# Patient Record
Sex: MALE | Race: WHITE | Employment: OTHER | URBAN - METROPOLITAN AREA
[De-identification: names, ages, dates, MRNs, and addresses within clinical notes are randomized per-mention and may not be internally consistent; named-entity substitution may affect disease eponyms.]

---

## 2017-01-01 ENCOUNTER — TELEPHONE (OUTPATIENT)
Dept: FAMILY MEDICINE CLINIC | Age: 77
End: 2017-01-01

## 2017-01-01 ENCOUNTER — TELEPHONE (OUTPATIENT)
Dept: SLEEP MEDICINE | Age: 77
End: 2017-01-01

## 2017-01-01 ENCOUNTER — OFFICE VISIT (OUTPATIENT)
Dept: SLEEP MEDICINE | Age: 77
End: 2017-01-01

## 2017-01-01 ENCOUNTER — OFFICE VISIT (OUTPATIENT)
Dept: FAMILY MEDICINE CLINIC | Age: 77
End: 2017-01-01

## 2017-01-01 ENCOUNTER — OFFICE VISIT (OUTPATIENT)
Dept: NEUROLOGY | Age: 77
End: 2017-01-01

## 2017-01-01 ENCOUNTER — DOCUMENTATION ONLY (OUTPATIENT)
Dept: SLEEP MEDICINE | Age: 77
End: 2017-01-01

## 2017-01-01 VITALS
WEIGHT: 258 LBS | HEIGHT: 70 IN | HEART RATE: 69 BPM | RESPIRATION RATE: 16 BRPM | DIASTOLIC BLOOD PRESSURE: 92 MMHG | BODY MASS INDEX: 36.94 KG/M2 | OXYGEN SATURATION: 97 % | SYSTOLIC BLOOD PRESSURE: 144 MMHG | TEMPERATURE: 97.9 F

## 2017-01-01 VITALS
WEIGHT: 232 LBS | BODY MASS INDEX: 33.21 KG/M2 | HEART RATE: 69 BPM | TEMPERATURE: 98.3 F | OXYGEN SATURATION: 97 % | DIASTOLIC BLOOD PRESSURE: 92 MMHG | RESPIRATION RATE: 16 BRPM | SYSTOLIC BLOOD PRESSURE: 164 MMHG | HEIGHT: 70 IN

## 2017-01-01 VITALS
WEIGHT: 254 LBS | SYSTOLIC BLOOD PRESSURE: 168 MMHG | TEMPERATURE: 98.4 F | HEART RATE: 58 BPM | OXYGEN SATURATION: 96 % | HEIGHT: 70 IN | BODY MASS INDEX: 36.36 KG/M2 | DIASTOLIC BLOOD PRESSURE: 75 MMHG

## 2017-01-01 DIAGNOSIS — R01.1 MURMUR, CARDIAC: ICD-10-CM

## 2017-01-01 DIAGNOSIS — Z09 HOSPITAL DISCHARGE FOLLOW-UP: Primary | ICD-10-CM

## 2017-01-01 DIAGNOSIS — G47.33 OSA (OBSTRUCTIVE SLEEP APNEA): Primary | ICD-10-CM

## 2017-01-01 DIAGNOSIS — I10 ESSENTIAL HYPERTENSION WITH GOAL BLOOD PRESSURE LESS THAN 140/90: ICD-10-CM

## 2017-01-01 DIAGNOSIS — R20.0 NUMBNESS IN FEET: Primary | ICD-10-CM

## 2017-01-01 DIAGNOSIS — G57.93 NEUROPATHIC PAIN OF BOTH FEET: ICD-10-CM

## 2017-01-01 DIAGNOSIS — B35.6 TINEA CRURIS: ICD-10-CM

## 2017-01-01 DIAGNOSIS — N18.6 ESRD ON PERITONEAL DIALYSIS (HCC): ICD-10-CM

## 2017-01-01 DIAGNOSIS — Z99.2 ESRD ON PERITONEAL DIALYSIS (HCC): ICD-10-CM

## 2017-01-01 DIAGNOSIS — R60.9 EDEMA, UNSPECIFIED TYPE: ICD-10-CM

## 2017-01-01 DIAGNOSIS — E66.9 OBESITY (BMI 30-39.9): ICD-10-CM

## 2017-01-01 RX ORDER — GABAPENTIN 100 MG/1
200 CAPSULE ORAL
Qty: 180 CAP | Refills: 3 | Status: SHIPPED | OUTPATIENT
Start: 2017-01-01

## 2017-01-01 RX ORDER — DICLOFENAC SODIUM 10 MG/G
2 GEL TOPICAL 4 TIMES DAILY
Qty: 1 EACH | Refills: 2 | Status: SHIPPED | OUTPATIENT
Start: 2017-01-01

## 2017-01-01 RX ORDER — KETOCONAZOLE 20 MG/G
CREAM TOPICAL DAILY
Qty: 30 G | Refills: 3 | Status: SHIPPED | OUTPATIENT
Start: 2017-01-01

## 2017-01-17 ENCOUNTER — OFFICE VISIT (OUTPATIENT)
Dept: FAMILY MEDICINE CLINIC | Age: 77
End: 2017-01-17

## 2017-01-17 VITALS
DIASTOLIC BLOOD PRESSURE: 76 MMHG | WEIGHT: 212.5 LBS | HEART RATE: 57 BPM | SYSTOLIC BLOOD PRESSURE: 124 MMHG | BODY MASS INDEX: 30.49 KG/M2 | TEMPERATURE: 98.5 F | OXYGEN SATURATION: 97 %

## 2017-01-17 DIAGNOSIS — G47.33 OSA (OBSTRUCTIVE SLEEP APNEA): Primary | ICD-10-CM

## 2017-01-17 DIAGNOSIS — Z00.00 ROUTINE GENERAL MEDICAL EXAMINATION AT A HEALTH CARE FACILITY: ICD-10-CM

## 2017-01-17 DIAGNOSIS — M79.2 NEUROPATHIC PAIN: ICD-10-CM

## 2017-01-17 DIAGNOSIS — Z13.39 SCREENING FOR ALCOHOLISM: ICD-10-CM

## 2017-01-17 DIAGNOSIS — Z71.89 ADVANCED DIRECTIVES, COUNSELING/DISCUSSION: ICD-10-CM

## 2017-01-17 DIAGNOSIS — B35.6 TINEA CRURIS: ICD-10-CM

## 2017-01-17 RX ORDER — KETOCONAZOLE 20 MG/G
CREAM TOPICAL DAILY
Qty: 15 G | Refills: 6 | Status: SHIPPED | OUTPATIENT
Start: 2017-01-17 | End: 2017-01-01 | Stop reason: SDUPTHER

## 2017-01-17 RX ORDER — DICLOFENAC SODIUM 10 MG/G
2 GEL TOPICAL 4 TIMES DAILY
Qty: 1 EACH | Refills: 2 | Status: SHIPPED | OUTPATIENT
Start: 2017-01-17 | End: 2017-01-01 | Stop reason: SDUPTHER

## 2017-01-17 NOTE — MR AVS SNAPSHOT
Visit Information Date & Time Provider Department Dept. Phone Encounter #  
 1/17/2017 10:00 AM Jani Fournier MD [facility] [id number] Follow-up Instructions Return in about 6 months (around 7/17/2017). Upcoming Health Maintenance Date Due DTaP/Tdap/Td series (1 - Tdap) 6/28/1961 ZOSTER VACCINE AGE 60> 6/28/2000 GLAUCOMA SCREENING Q2Y 6/28/2005 Pneumococcal 65+ High/Highest Risk (1 of 2 - PCV13) 6/28/2005 INFLUENZA AGE 9 TO ADULT 8/1/2016 MEDICARE YEARLY EXAM 1/18/2018 Allergies as of 1/17/2017  Review Complete On: 1/17/2017 By: Jani Fournier MD  
  
 Severity Noted Reaction Type Reactions Other Food  11/30/2016    Nausea and Vomiting, Other (comments) BELL PEPPERS- NUMBNESS FACE & PUFFINESS;    
 Prednisone  11/30/2016    Other (comments) \"NECROTIC HIP\" Statins-hmg-coa Reductase Inhibitors  11/30/2016    Swelling, Other (comments) FLUSHING; DISORIENTATION Current Immunizations  Never Reviewed No immunizations on file. Not reviewed this visit You Were Diagnosed With   
  
 Codes Comments KAYA (obstructive sleep apnea)    -  Primary ICD-10-CM: G47.33 
ICD-9-CM: 327.23 Routine general medical examination at a health care facility     ICD-10-CM: Z00.00 ICD-9-CM: V70.0 Screening for alcoholism     ICD-10-CM: Z13.89 ICD-9-CM: V79.1 Advanced directives, counseling/discussion     ICD-10-CM: Z71.89 ICD-9-CM: V65.49 Neuropathic pain     ICD-10-CM: M79.2 ICD-9-CM: 729.2 Tinea cruris     ICD-10-CM: B35.6 ICD-9-CM: 110.3 Vitals BP Pulse Temp Weight(growth percentile) SpO2 BMI  
 124/76 (!) 57 98.5 °F (36.9 °C) 212 lb 8 oz (96.4 kg) 97% 30.49 kg/m2 Smoking Status Former Smoker BMI and BSA Data Body Mass Index Body Surface Area  
 30.49 kg/m 2 2.18 m 2 Preferred Pharmacy Pharmacy Name Phone [facility] [address] [place] [address],  Liberty Frye [address] [place] [address] [phone] Your Updated Medication List  
  
   
This list is accurate as of: 1/17/17 10:59 AM.  Always use your most recent med list.  
  
  
  
  
 aspirin delayed-release 81 mg tablet Take  by mouth daily. calcitRIOL 0.5 mcg capsule Commonly known as:  ROCALTROL Take 0.5 mcg by mouth daily. DIALYVITE 800 WITH ZINC PO Take  by mouth. diclofenac 1 % Gel Commonly known as:  VOLTAREN Apply 2 g to affected area four (4) times daily. furosemide 40 mg tablet Commonly known as:  LASIX Take 1 Tab by mouth two (2) times a day.  
  
 gabapentin 100 mg capsule Commonly known as:  NEURONTIN  
TK 1 PO QHS  Indications: NEUROPATHIC PAIN  
  
 GLUCOSAMINE MSM PO Take  by mouth. 2 TABS DAILY  
  
 hydrALAZINE 50 mg tablet Commonly known as:  APRESOLINE Take 25 mg by mouth three (3) times daily as needed. HYDROcodone-acetaminophen 5-325 mg per tablet Commonly known as:  Abby Arnie Take 1-2 Tabs by mouth every four (4) hours as needed for Pain. Max Daily Amount: 12 Tabs.  
  
 ketoconazole 2 % topical cream  
Commonly known as:  NIZORAL Apply  to affected area daily. labetalol 100 mg tablet Commonly known as:  NORMODYNE  
TK 2 T PO BID FOR BLOOD PRESSURE GREATER THAN 130  
  
 losartan 100 mg tablet Commonly known as:  COZAAR Take 100 mg by mouth daily. RENVELA 800 mg Tab tab Generic drug:  sevelamer carbonate Take  by mouth three (3) times daily. 2-4 TABS WITH MEALS  
  
 TYLENOL EXTRA STRENGTH 500 mg tablet Generic drug:  acetaminophen Take  by mouth every six (6) hours as needed for Pain. VITAMIN D3 1,000 unit Cap Generic drug:  cholecalciferol Take  by mouth daily. Prescriptions Sent to Pharmacy Refills  
 diclofenac (VOLTAREN) 1 % gel 2 Sig: Apply 2 g to affected area four (4) times daily.   
 Class: Normal  
 Pharmacy: [facility] [address] [facility], [address] [place] [address] [address] Ph #: [phone] Route: Topical  
 ketoconazole (NIZORAL) 2 % topical cream 6 Sig: Apply  to affected area daily. Class: Normal  
 Pharmacy: [facility] [address] [place] [address], [address] [place] [address] [address] Ph #: [phone] Route: Topical  
  
We Performed the Following ADVANCE CARE PLANNING FIRST 30 MINS [47854 CPT(R)] REFERRAL TO NEUROLOGY [RTU71 Custom] Comments:  
 Neuropathic pain in both feet. eval and treat SLEEP MEDICINE REFERRAL [EKI733 Custom] Comments: H/o sleep apnea , needs update on RX for cpap and equipment. eval and treat Follow-up Instructions Return in about 6 months (around 7/17/2017). Referral Information Referral ID Referred By Referred To  
  
 [id number] [place], [address] [facility], [address] [facility] Armando Valdez  Phone: [phone] Fax: [phone] Visits Status Start Date End Date 1 New Request 1/17/17 1/17/18 If your referral has a status of pending review or denied, additional information will be sent to support the outcome of this decision. Referral ID Referred By Referred To  
 [id number] BRAULIO, [address] [facility], [address] [facility] Phone: [phone] Fax: [phone] Visits Status Start Date End Date 1 New Request 1/17/17 1/17/18 If your referral has a status of pending review or denied, additional information will be sent to support the outcome of this decision. Introducing [facility]! Beth Rivas introduces [employer] patient portal. Now you can access parts of your medical record, email your doctor's office, and request medication refills online.    
 
1. In your internet browser, go to [email or web address] 2. Click on the First Time User? Click Here link in the Sign In box. You will see the New Member Sign Up page. 3. Enter your [employer] Access Code exactly as it appears below. You will not need to use this code after youve completed the sign-up process. If you do not sign up before the expiration date, you must request a new code. · [employer] Access Code: [email or web address] Expires: 4/17/2017 10:56 AM 
 
4. Enter the last four digits of your Social Security Number (xxxx) and Date of Birth (mm/dd/yyyy) as indicated and click Submit. You will be taken to the next sign-up page. 5. Create a [email or web address] ID. This will be your [employer] login ID and cannot be changed, so think of one that is secure and easy to remember. 6. Create a [employer] password. You can change your password at any time. 7. Enter your Password Reset Question and Answer. This can be used at a later time if you forget your password. 8. Enter your e-mail address. You will receive e-mail notification when new information is available in [address] 9. Click Sign Up. You can now view and download portions of your medical record. 10. Click the Download Summary menu link to download a portable copy of your medical information. If you have questions, please visit the Frequently Asked Questions section of the [employer] website. Remember, [employer] is NOT to be used for urgent needs. For medical emergencies, dial 911. Now available from your iPhone and Android! Please provide this summary of care documentation to your next provider. Your primary care clinician is listed as Faith Pond. If you have any questions after today's visit, please call [phone].

## 2017-01-17 NOTE — PROGRESS NOTES
1. Have you been to the ER, urgent care clinic since your last visit? Hospitalized since your last visit? No    2. Have you seen or consulted any other health care providers outside of the [address] [place] [address] since your last visit? Include any pap smears or colon screening.  No     Chief Complaint   Patient presents with   [facility] Annual Wellness Visit

## 2017-01-17 NOTE — ACP (ADVANCE CARE PLANNING)
Advance Care Planning    Advance Care Planning (ACP) Provider Conversation Snapshot    Date of ACP Conversation: 01/17/17  Persons included in Conversation:  patient and family  Length of ACP Conversation in minutes:  20 minutes    Authorized Decision Maker (if patient is incapable of making informed decisions): This person is:   d          For Patients with Decision Making Capacity:   Values/Goals: Exploration of values, goals, and preferences if recovery is not expected, even with continued medical treatment in the event of:  Imminent death  \"In these circumstances, what matters most to you? \"  Care focused more on comfort or quality of life.     Conversation Outcomes / Follow-Up Plan:   Recommended completion of Advance Directive form after review of ACP materials and conversation with prospective healthcare agent

## 2017-01-17 NOTE — PROGRESS NOTES
This is a Subsequent Medicare Annual Wellness Visit providing Personalized Prevention Plan Services (PPPS) (Performed 12 months after initial AWV and PPPS )    I have reviewed the patient's medical history in detail and updated the computerized patient record. Needs referral to sleep med for update on cpap  Has h/o neuropathic pain in both feet. He want to see neurology to determine why  History     Past Medical History   Diagnosis Date    Adverse effect of anesthesia      WAKING DURING SURGERY; COMBATIVE    CAD (coronary artery disease)     Chronic kidney disease     GERD (gastroesophageal reflux disease)      HX-NOT CURRENTLY    Hypertension     Neuropathy     Sleep apnea      CPAP    Thromboembolus (HCC)      L LEG- PT STATED HE DID NOT SEE AN M.YUAN Barber 2010      Past Surgical History   Procedure Laterality Date    Pr cardiac surg procedure unlist  2011     CABG; AORTIC VALVE    Hx rotator cuff repair       RIGHT    Hx appendectomy      Hx urological       KIDNEY STONE REMOVED    Hx urological       LITHOTRIPSY    Hx tonsillectomy      Hx adenoidectomy      Hx other surgical  2013     PERITONEAL CATHETER     Hx other surgical  2012     R CHEST CATH FOR DIALYSIS / REMOVAL    Hx hernia repair  2013     W/MESH     Current Outpatient Prescriptions   Medication Sig Dispense Refill    diclofenac (VOLTAREN) 1 % gel Apply 2 g to affected area four (4) times daily. 1 Each 2    ketoconazole (NIZORAL) 2 % topical cream Apply  to affected area daily. 15 g 6    HYDROcodone-acetaminophen (NORCO) 5-325 mg per tablet Take 1-2 Tabs by mouth every four (4) hours as needed for Pain. Max Daily Amount: 12 Tabs. 55 Tab 0    losartan (COZAAR) 100 mg tablet Take 100 mg by mouth daily.       gabapentin (NEURONTIN) 100 mg capsule TK 1 PO QHS  Indications: NEUROPATHIC PAIN (Patient taking differently: 200 mg. TK 1 PO QHS  Indications: NEUROPATHIC PAIN) 90 Cap 3    labetalol (NORMODYNE) 100 mg tablet TK 2 T PO BID FOR BLOOD PRESSURE GREATER THAN 130  5    sevelamer carbonate (RENVELA) 800 mg tab tab Take  by mouth three (3) times daily. 2-4 TABS WITH MEALS      VITAMIN B COMP AND C/FA/ZINC (DIALYVITE 800 WITH ZINC PO) Take  by mouth.  GLUCOSAMINE HCL/MSM (GLUCOSAMINE MSM PO) Take  by mouth. 2 TABS DAILY      acetaminophen (TYLENOL EXTRA STRENGTH) 500 mg tablet Take  by mouth every six (6) hours as needed for Pain.  hydrALAZINE (APRESOLINE) 50 mg tablet Take 25 mg by mouth three (3) times daily as needed.  cholecalciferol (VITAMIN D3) 1,000 unit cap Take  by mouth daily.  calcitRIOL (ROCALTROL) 0.5 mcg capsule Take 0.5 mcg by mouth daily.  aspirin delayed-release 81 mg tablet Take  by mouth daily.  furosemide (LASIX) 40 mg tablet Take 1 Tab by mouth two (2) times a day.  60 Tab 6     Allergies   Allergen Reactions    Other Food Nausea and Vomiting and Other (comments)     BELL PEPPERS- NUMBNESS FACE & PUFFINESS;      Prednisone Other (comments)     \"NECROTIC HIP\"    Statins-Hmg-Coa Reductase Inhibitors Swelling and Other (comments)     FLUSHING; DISORIENTATION     Family History   Problem Relation Age of Onset    Hypertension Mother     Hypertension Father     Heart Disease Father     Cancer Brother      PROSTATE    Anesth Problems Neg Hx      Social History   Substance Use Topics    Smoking status: Former Smoker    Smokeless tobacco: Never Used    Alcohol use Yes      Comment: OCCASIONAL     Patient Active Problem List   Diagnosis Code    Essential hypertension with goal blood pressure less than 140/90 I10    Edema R60.9    Hearing loss H91.90    Murmur, cardiac R01.1    Neuropathic pain of both feet ([address] G62.9    ESRD on peritoneal dialysis ([address] N18.6, Z99.2       Depression Risk Factor Screening:     PHQ 2 / 9, over the last two weeks 1/17/2017   Little interest or pleasure in doing things Not at all   Feeling down, depressed or hopeless Not at all   Total Score PHQ 2 0     Alcohol Risk Factor Screening: On any occasion during the past 3 months, have you had more than 4 drinks containing alcohol? No    Do you average more than 14 drinks per week? No      Functional Ability and Level of Safety:     Hearing Loss   normal-to-mild    Activities of Daily Living   Self-care. Requires assistance with: no ADLs    Fall Risk     Fall Risk Assessment, last 12 mths 1/17/2017   Able to walk? Yes   Fall in past 12 months? No     Abuse Screen   Patient is not abused    Review of Systems   A comprehensive review of systems was negative except for that written in the HPI. Physical Examination     Evaluation of Cognitive Function:  Mood/affect:  happy  Appearance: age appropriate  Family member/caregiver input:       Visit Vitals    /76    Pulse (!) 57    Temp 98.5 °F (36.9 °C)    Wt 212 lb 8 oz (96.4 kg)    SpO2 97%    BMI 30.49 kg/m2     General:  Alert, cooperative, no distress, appears stated age. Head:  Normocephalic, without obvious abnormality, atraumatic. Eyes:  Conjunctivae/corneas clear. PERRL, EOMs intact. Fundi benign   Ears:  Normal TMs and external ear canals both ears. Nose: Nares normal. Septum midline. Mucosa normal. No drainage or sinus tenderness. Throat: Lips, mucosa, and tongue normal. Teeth and gums normal.   Neck: Supple, symmetrical, trachea midline, no adenopathy, thyroid: no enlargement/tenderness/nodules, no carotid bruit and no JVD. Back:   Symmetric, no curvature. ROM normal. No CVA tenderness. Lungs:   Clear to auscultation bilaterally. Chest wall:  No tenderness or deformity. Heart:  Regular rate and rhythm, S1, S2 normal, 2/6 murmur, no click, rub or gallop. Abdomen:   Soft, non-tender. Bowel sounds normal. No masses,  No organomegaly. Extremities: Extremities normal, atraumatic, no cyanosis or edema. Pulses: 2+ and symmetric all extremities.    Skin: Skin color, texture, turgor normal. No rashes or lesions   Lymph nodes: Cervical, supraclavicular, and axillary nodes normal.   Neurologic: CNII-XII intact. Normal strength, sensation and reflexes throughout. Patient Care Team:  Jorge Luis Mccoy MD as PCP - [facility])    Advice/Referrals/Counseling   Education and counseling provided:  Are appropriate based on today's review and evaluation    Assessment/Plan       ICD-10-CM ICD-9-CM    1. KAYA (obstructive sleep apnea) G47.33 327.23 SLEEP MEDICINE REFERRAL   2. Routine general medical examination at a health care facility Z00.00 V70.0    3. Screening for alcoholism Z13.89 V79.1    4. Advanced directives, counseling/discussion Z71.89 V65.49 ADVANCE CARE PLANNING FIRST 30 MINS   5. Neuropathic pain M79.2 729.2 REFERRAL TO NEUROLOGY   6.  Tinea cruris B35.6 110.3 ketoconazole (NIZORAL) 2 % topical cream   .

## 2017-02-17 NOTE — PROGRESS NOTES
Patients PAP machine was unable to be downloaded. The following information was able to be collected: Therapy Type: CPAP  PAP /Model: Respironcs/REMStar-Pro/Mseries  Set Pressure: ?  Heated humidifier: yes  Mask /Size: None    No power cord. Cannot get pressure.

## 2017-02-17 NOTE — TELEPHONE ENCOUNTER
Pharmacy called to inform me the pt said that the medication order was wrong for the Gabapentin 100 mg TK 1 PO QHS  Indications: NEUROPATHIC PAIN, it should be take 300 mg every day. I could not find anything to support this claim, so I denied the change and forward it to Dr. Apryl Quezada for review.

## 2017-02-17 NOTE — PATIENT INSTRUCTIONS
[address] [facility] [place]. [place], [address]  Tel.  [phone]  Fax. 100 [facility] 60  [place], [address] [facility]  Tel.  [phone]  Fax. [phone] [address]  Tel.  [phone]  Fax. [phone]     Learning About CPAP for Sleep Apnea  What is CPAP? CPAP is a small machine that you use at home every night while you sleep. It increases air pressure in your throat to keep your airway open. When you have sleep apnea, this can help you sleep better so you feel much better. CPAP stands for \"continuous positive airway pressure. \"  The CPAP machine will have one of the following:  · A mask that covers your nose and mouth  · Prongs that fit into your nose  · A mask that covers your nose only, the most common type. This type is called NCPAP. The N stands for \"nasal.\"  Why is it done? CPAP is usually the best treatment for obstructive sleep apnea. It is the first treatment choice and the most widely used. Your doctor may suggest CPAP if you have:  · Moderate to severe sleep apnea. · Sleep apnea and coronary artery disease (CAD) or heart failure. How does it help? · CPAP can help you have more normal sleep, so you feel less sleepy and more alert during the daytime. · CPAP may help keep heart failure or other heart problems from getting worse. · NCPAP may help lower your blood pressure. · If you use CPAP, your bed partner may also sleep better because you are not snoring or restless. What are the side effects? Some people who use CPAP have:  · A dry or stuffy nose and a sore throat. · Irritated skin on the face. · Sore eyes. · Bloating. If you have any of these problems, work with your doctor to fix them. Here are some things you can try:  · Be sure the mask or nasal prongs fit well. · See if your doctor can adjust the pressure of your CPAP. · If your nose is dry, try a humidifier.   · If your nose is runny or stuffy, try decongestant medicine or a steroid nasal spray. If these things do not help, you might try a different type of machine. Some machines have air pressure that adjusts on its own. Others have air pressures that are different when you breathe in than when you breathe out. This may reduce discomfort caused by too much pressure in your nose. Where can you learn more? Go to Ranker.be  Enter Al Magdaleno in the search box to learn more about \"Learning About CPAP for Sleep Apnea. \"   © 9037-0595 Healthwise, Incorporated. Care instructions adapted under license by [employer] (which disclaims liability or warranty for this information). This care instruction is for use with your licensed healthcare professional. If you have questions about a medical condition or this instruction, always ask your healthcare professional. [address] any warranty or liability for your use of this information. Content Version: 0.3.46316; Last Revised: January 11, 2010  PROPER SLEEP HYGIENE    What to avoid  · Do not have drinks with caffeine, such as coffee or black tea, for 8 hours before bed. · Do not smoke or use other types of tobacco near bedtime. Nicotine is a stimulant and can keep you awake. · Avoid drinking alcohol late in the evening, because it can cause you to wake in the middle of the night. · Do not eat a big meal close to bedtime. If you are hungry, eat a light snack. · Do not drink a lot of water close to bedtime, because the need to urinate may wake you up during the night. · Do not read or watch TV in bed. Use the bed only for sleeping and sexual activity. What to try  · Go to bed at the same time every night, and wake up at the same time every morning. Do not take naps during the day. · Keep your bedroom quiet, dark, and cool. · Get regular exercise, but not within 3 to 4 hours of your bedtime. .  · Sleep on a comfortable pillow and mattress.   · If watching the clock makes you anxious, turn it facing away from you so you cannot see the time. · If you worry when you lie down, start a worry book. Well before bedtime, write down your worries, and then set the book and your concerns aside. · Try meditation or other relaxation techniques before you go to bed. · If you cannot fall asleep, get up and go to another room until you feel sleepy. Do something relaxing. Repeat your bedtime routine before you go to bed again. · Make your house quiet and calm about an hour before bedtime. Turn down the lights, turn off the TV, log off the computer, and turn down the volume on music. This can help you relax after a busy day. Drowsy Driving: The [facility] 54 cites drowsiness as a causing factor in more than 732,018 police reported crashes annually, resulting in 76,000 injuries and 1,500 deaths. Other surveys suggest 55% of people polled have driven while drowsy in the past year, 23% had fallen asleep but not crashed, 3% crashed, and 2% had and accident due to drowsy driving. Who is at risk? Young Drivers: One study of drowsy driving accidents states that 55% of the drivers were under 25 years. Of those, 75% were male. Shift Workers and Travelers: People who work overnight or travel across time zones frequently are at higher risk of experiencing Circadian Rhythm Disorders. They are trying to work and function when their body is programed to sleep. Sleep Deprived: Lack of sleep has a serious impact on your ability to pay attention or focus on a task. Consistently getting less than the average of 8 hours your body needs creates partial or cumulative sleep deprivation. Untreated Sleep Disorders: Sleep Apnea, Narcolepsy, R.L.S., and other sleep disorders (untreated) prevent a person from getting enough restful sleep. This leads to excessive daytime sleepiness and increases the risk for drowsy driving accidents by up to 7 times.   Medications / Alcohol: Even over the counter medications can cause drowsiness. Medications that impair a drivers attention should have a warning label. Alcohol naturally makes you sleepy and on its own can cause accidents. Combined with excessive drowsiness its effects are amplified. Signs of Drowsy Driving:   * You don't remember driving the last few miles   * You may drift out of your jaxon   * You are unable to focus and your thoughts wander   * You may yawn more often than normal   * You have difficulty keeping your eyes open / nodding off   * Missing traffic signs, speeding, or tailgating  Prevention-   Good sleep hygiene, lifestyle and behavioral choices have the most impact on drowsy driving. There is no substitute for sleep and the average person requires 8 hours nightly. If you find yourself driving drowsy, stop and sleep. Consider the sleep hygiene tips provided during your visit as well. Medication Refill Policy: Refills for all medications require 1 week advance notice. Please have your pharmacy fax a refill request. We are unable to fax, or call in \"controled substance\" medications and you will need to pick these prescriptions up from our office. [employer] Activation    Thank you for requesting access to [employer]. Please follow the instructions below to securely access and download your online medical record. [employer] allows you to send messages to your doctor, view your test results, renew your prescriptions, schedule appointments, and more. How Do I Sign Up? 1. In your internet browser, go to [email or web address]. 2. Click on the First Time User? Click Here link in the Sign In box. You will see the New Member Sign Up page. 3. Enter your [employer] Access Code exactly as it appears below. You will not need to use this code after youve completed the sign-up process. If you do not sign up before the expiration date, you must request a new code.     [employer] Access Code: [email or web address]  Expires: 2017 10:56 AM (This is the date your Purple access code will )    4. Enter the last four digits of your Social Security Number (xxxx) and Date of Birth (mm/dd/yyyy) as indicated and click Submit. You will be taken to the next sign-up page. 5. Create a Purple ID. This will be your Purple login ID and cannot be changed, so think of one that is secure and easy to remember. 6. Create a Purple password. You can change your password at any time. 7. Enter your Password Reset Question and Answer. This can be used at a later time if you forget your password. 8. Enter your e-mail address. You will receive e-mail notification when new information is available in [address] 9. Click Sign Up. You can now view and download portions of your medical record. 10. Click the Download Summary menu link to download a portable copy of your medical information. Additional Information    If you have questions, please call [phone]. Remember, Purple is NOT to be used for urgent needs. For medical emergencies, dial 911. Starting a Weight Loss Plan: After Your Visit  Your Care Instructions  If you are thinking about losing weight, it can be hard to know where to start. Your doctor can help you set up a weight loss plan that best meets your needs. You may want to take a class on nutrition or exercise, or join a weight loss support group. If you have questions about how to make changes to your eating or exercise habits, ask your doctor about seeing a registered dietitian or an exercise specialist.  It can be a big challenge to lose weight. But you do not have to make huge changes at once. Make small changes, and stick with them. When those changes become habit, add a few more changes. If you do not think you are ready to make changes right now, try to pick a date in the future.  Make an appointment to see your doctor to discuss whether the time is right for you to start a plan.  Follow-up care is a key part of your treatment and safety. Be sure to make and go to all appointments, and call your doctor if you are having problems. It's also a good idea to know your test results and keep a list of the medicines you take. How can you care for yourself at home? · Set realistic goals. Many people expect to lose much more weight than is likely. A weight loss of 5% to 10% of your body weight may be enough to improve your health. · Get family and friends involved to provide support. Talk to them about why you are trying to lose weight, and ask them to help. They can help by participating in exercise and having meals with you, even if they may be eating something different. · Find what works best for you. If you do not have time or do not like to cook, a program that offers meal replacement bars or shakes may be better for you. Or if you like to prepare meals, finding a plan that includes daily menus and recipes may be best.  · Ask your doctor about other health professionals who can help you achieve your weight loss goals. ¨ A dietitian can help you make healthy changes in your diet. ¨ An exercise specialist or  can help you develop a safe and effective exercise program.  ¨ A counselor or psychiatrist can help you cope with issues such as depression, anxiety, or family problems that can make it hard to focus on weight loss. · Consider joining a support group for people who are trying to lose weight. Your doctor can suggest groups in your area. Where can you learn more? Go to Sellsy.be  Enter U357 in the search box to learn more about \"Starting a Weight Loss Plan: After Your Visit. \"   © 7378-1575 Healthwise, Incorporated. Care instructions adapted under license by [facility] (which disclaims liability or warranty for this information).  This care instruction is for use with your licensed healthcare professional. If you have questions about a medical condition or this instruction, always ask your healthcare professional. [place] [address] any warranty or liability for your use of this information.   Content Version: 4.8.61712; Last Revised: September 1, 2009

## 2017-02-17 NOTE — MR AVS SNAPSHOT
Visit Information Date & Time Provider Department Dept. Phone Encounter #  
 2/17/2017 11:20 AM Mack Jones, [address] [id number] Follow-up Instructions Return in about 1 year (around 2/17/2018), or if symptoms worsen or fail to improve. Follow-up and Disposition History Your Appointments 3/2/2017  1:00 PM  
New Patient with Matias Bond MD  
[facility] Appt Note: np..possible neuropathy seb [place] [address] [facility] [id number] [phone]  
  
   
 [place] [address] [id number] [address] Upcoming Health Maintenance Date Due DTaP/Tdap/Td series (1 - Tdap) 6/28/1961 ZOSTER VACCINE AGE 60> 6/28/2000 GLAUCOMA SCREENING Q2Y 6/28/2005 Pneumococcal 65+ High/Highest Risk (1 of 2 - PCV13) 6/28/2005 INFLUENZA AGE 9 TO ADULT 8/1/2016 MEDICARE YEARLY EXAM 1/18/2018 Allergies as of 2/17/2017  Review Complete On: 2/17/2017 By: Mack Jones MD  
  
 Severity Noted Reaction Type Reactions Other Food  11/30/2016    Nausea and Vomiting, Other (comments) BELL PEPPERS- NUMBNESS FACE & PUFFINESS;    
 Prednisone  11/30/2016    Other (comments) \"NECROTIC HIP\" Statins-hmg-coa Reductase Inhibitors  11/30/2016    Swelling, Other (comments) FLUSHING; DISORIENTATION Current Immunizations  Never Reviewed No immunizations on file. Not reviewed this visit You Were Diagnosed With   
  
 Codes Comments KAYA (obstructive sleep apnea)    -  Primary ICD-10-CM: G47.33 
ICD-9-CM: 327.23 Obesity (BMI 30-39. 9)     ICD-10-CM: E66.9 ICD-9-CM: 278.00 Vitals BP Pulse Temp Height(growth percentile) Weight(growth percentile) SpO2  
 168/75 (!) 58 98.4 °F (36.9 °C) 5' 10\" (1.778 m) 254 lb (115.2 kg) 96% BMI Smoking Status 36.45 kg/m2 Former Smoker Vitals History BMI and BSA Data Body Mass Index Body Surface Area  
 36.45 kg/m 2 2.39 m 2 [facility],  [place] [address] [place] [address] [phone] Your Updated Medication List  
  
   
This list is accurate as of: 2/17/17 12:36 PM.  Always use your most recent med list.  
  
  
  
  
 aspirin delayed-release 81 mg tablet Take  by mouth daily. calcitRIOL 0.5 mcg capsule Commonly known as:  ROCALTROL Take 0.5 mcg by mouth daily. DIALYVITE 800 WITH ZINC PO Take  by mouth. diclofenac 1 % Gel Commonly known as:  VOLTAREN Apply 2 g to affected area four (4) times daily. furosemide 40 mg tablet Commonly known as:  LASIX Take 1 Tab by mouth two (2) times a day.  
  
 gabapentin 100 mg capsule Commonly known as:  NEURONTIN  
TK 1 PO QHS  Indications: NEUROPATHIC PAIN  
  
 GLUCOSAMINE MSM PO Take  by mouth. 2 TABS DAILY  
  
 hydrALAZINE 50 mg tablet Commonly known as:  APRESOLINE Take 25 mg by mouth three (3) times daily as needed. HYDROcodone-acetaminophen 5-325 mg per tablet Commonly known as:  Villanueva Ale Take 1-2 Tabs by mouth every four (4) hours as needed for Pain. Max Daily Amount: 12 Tabs.  
  
 ketoconazole 2 % topical cream  
Commonly known as:  NIZORAL Apply  to affected area daily. labetalol 100 mg tablet Commonly known as:  NORMODYNE  
TK 2 T PO BID FOR BLOOD PRESSURE GREATER THAN 130  
  
 losartan 100 mg tablet Commonly known as:  COZAAR Take 100 mg by mouth daily. RENVELA 800 mg Tab tab Generic drug:  sevelamer carbonate Take  by mouth three (3) times daily. 2-4 TABS WITH MEALS  
  
 TYLENOL EXTRA STRENGTH 500 mg tablet Generic drug:  acetaminophen Take  by mouth every six (6) hours as needed for Pain. VITAMIN D3 1,000 unit Cap Generic drug:  cholecalciferol Take  by mouth daily. We Performed the Following AMB SUPPLY ORDER [[id number] Custom] Comments:  
 PAP Mask -patient preference, tubing, filters as needed (all sleep supplies) Length of Need = 99 months Vanessa Mireles M.D.  (electronically signed) Diplomate in Sleep Medicine, ABIM Follow-up Instructions Return in about 1 year (around 2/17/2018), or if symptoms worsen or fail to improve. Patient Instructions 217 [facility] [place]. 1668 [facility], [address] Tel.  [phone] Fax. 100 [facility] 60 [place], [address] [facility] Tel.  [phone] Fax. [phone] [address] [facility] [place] [address] Tel.  [phone] Fax. [phone] Learning About CPAP for Sleep Apnea What is CPAP? CPAP is a small machine that you use at home every night while you sleep. It increases air pressure in your throat to keep your airway open. When you have sleep apnea, this can help you sleep better so you feel much better. CPAP stands for \"continuous positive airway pressure. \" The CPAP machine will have one of the following: · A mask that covers your nose and mouth · Prongs that fit into your nose · A mask that covers your nose only, the most common type. This type is called NCPAP. The N stands for \"nasal.\" Why is it done? CPAP is usually the best treatment for obstructive sleep apnea. It is the first treatment choice and the most widely used. Your doctor may suggest CPAP if you have: · Moderate to severe sleep apnea. · Sleep apnea and coronary artery disease (CAD) or heart failure. How does it help? · CPAP can help you have more normal sleep, so you feel less sleepy and more alert during the daytime. · CPAP may help keep heart failure or other heart problems from getting worse. · NCPAP may help lower your blood pressure. · If you use CPAP, your bed partner may also sleep better because you are not snoring or restless. What are the side effects? Some people who use CPAP have: · A dry or stuffy nose and a sore throat. · Irritated skin on the face. · Sore eyes. · Bloating. If you have any of these problems, work with your doctor to fix them. Here are some things you can try: · Be sure the mask or nasal prongs fit well. · See if your doctor can adjust the pressure of your CPAP. · If your nose is dry, try a humidifier. · If your nose is runny or stuffy, try decongestant medicine or a steroid nasal spray. If these things do not help, you might try a different type of machine. Some machines have air pressure that adjusts on its own. Others have air pressures that are different when you breathe in than when you breathe out. This may reduce discomfort caused by too much pressure in your nose. Where can you learn more? Go to Flexiroam.be Enter B681 in the search box to learn more about \"Learning About CPAP for Sleep Apnea. \"  
© 0904-9835 Healthwise, Incorporated. Care instructions adapted under license by [employer] (which disclaims liability or warranty for this information). This care instruction is for use with your licensed healthcare professional. If you have questions about a medical condition or this instruction, always ask your healthcare professional. [address] any warranty or liability for your use of this information. Content Version: 5.0.27307; Last Revised: January 11, 2010 PROPER SLEEP HYGIENE What to avoid · Do not have drinks with caffeine, such as coffee or black tea, for 8 hours before bed. · Do not smoke or use other types of tobacco near bedtime. Nicotine is a stimulant and can keep you awake. · Avoid drinking alcohol late in the evening, because it can cause you to wake in the middle of the night. · Do not eat a big meal close to bedtime. If you are hungry, eat a light snack.  
· Do not drink a lot of water close to bedtime, because the need to urinate may wake you up during the night. · Do not read or watch TV in bed. Use the bed only for sleeping and sexual activity. What to try · Go to bed at the same time every night, and wake up at the same time every morning. Do not take naps during the day. · Keep your bedroom quiet, dark, and cool. · Get regular exercise, but not within 3 to 4 hours of your bedtime. Rissa Winchester · Sleep on a comfortable pillow and mattress. · If watching the clock makes you anxious, turn it facing away from you so you cannot see the time. · If you worry when you lie down, start a worry book. Well before bedtime, write down your worries, and then set the book and your concerns aside. · Try meditation or other relaxation techniques before you go to bed. · If you cannot fall asleep, get up and go to another room until you feel sleepy. Do something relaxing. Repeat your bedtime routine before you go to bed again. · Make your house quiet and calm about an hour before bedtime. Turn down the lights, turn off the TV, log off the computer, and turn down the volume on music. This can help you relax after a busy day. Drowsy Driving: The [employer] cites drowsiness as a causing factor in more than 551,367 police reported crashes annually, resulting in 76,000 injuries and 1,500 deaths. Other surveys suggest 55% of people polled have driven while drowsy in the past year, 23% had fallen asleep but not crashed, 3% crashed, and 2% had and accident due to drowsy driving. Who is at risk? Young Drivers: One study of drowsy driving accidents states that 55% of the drivers were under 25 years. Of those, 75% were male. Shift Workers and Travelers: People who work overnight or travel across time zones frequently are at higher risk of experiencing Circadian Rhythm Disorders. They are trying to work and function when their body is programed to sleep. Sleep Deprived: Lack of sleep has a serious impact on your ability to pay attention or focus on a task. Consistently getting less than the average of 8 hours your body needs creates partial or cumulative sleep deprivation. Untreated Sleep Disorders: Sleep Apnea, Narcolepsy, R.L.S., and other sleep disorders (untreated) prevent a person from getting enough restful sleep. This leads to excessive daytime sleepiness and increases the risk for drowsy driving accidents by up to 7 times. Medications / Alcohol: Even over the counter medications can cause drowsiness. Medications that impair a drivers attention should have a warning label. Alcohol naturally makes you sleepy and on its own can cause accidents. Combined with excessive drowsiness its effects are amplified. Signs of Drowsy Driving: * You don't remember driving the last few miles * You may drift out of your jaxon * You are unable to focus and your thoughts wander * You may yawn more often than normal 
 * You have difficulty keeping your eyes open / nodding off * Missing traffic signs, speeding, or tailgating Prevention-  
Good sleep hygiene, lifestyle and behavioral choices have the most impact on drowsy driving. There is no substitute for sleep and the average person requires 8 hours nightly. If you find yourself driving drowsy, stop and sleep. Consider the sleep hygiene tips provided during your visit as well. Medication Refill Policy: Refills for all medications require 1 week advance notice. Please have your pharmacy fax a refill request. We are unable to fax, or call in \"controled substance\" medications and you will need to pick these prescriptions up from our office. Jumpido Activation Thank you for requesting access to Jumpido. Please follow the instructions below to securely access and download your online medical record.  Jumpido allows you to send messages to your doctor, view your test results, renew your prescriptions, schedule appointments, and more. How Do I Sign Up? 1. In your internet browser, go to [email or web address]. 2. Click on the First Time User? Click Here link in the Sign In box. You will see the New Member Sign Up page. 3. Enter your Zuki Access Code exactly as it appears below. You will not need to use this code after youve completed the sign-up process. If you do not sign up before the expiration date, you must request a new code. Zuki Access Code: [email or web address] Expires: 2017 10:56 AM (This is the date your Zuki access code will ) 4. Enter the last four digits of your Social Security Number (xxxx) and Date of Birth (mm/dd/yyyy) as indicated and click Submit. You will be taken to the next sign-up page. 5. Create a Zuki ID. This will be your Zuki login ID and cannot be changed, so think of one that is secure and easy to remember. 6. Create a Zuki password. You can change your password at any time. 7. Enter your Password Reset Question and Answer. This can be used at a later time if you forget your password. 8. Enter your e-mail address. You will receive e-mail notification when new information is available in [address] 9. Click Sign Up. You can now view and download portions of your medical record. 10. Click the Download Summary menu link to download a portable copy of your medical information. Additional Information If you have questions, please call [phone]. Remember, Zuki is NOT to be used for urgent needs. For medical emergencies, dial 911. Starting a Weight Loss Plan: After Your Visit Your Care Instructions If you are thinking about losing weight, it can be hard to know where to start. Your doctor can help you set up a weight loss plan that best meets your needs. You may want to take a class on nutrition or exercise, or join a weight loss support group.  If you have questions about how to make changes to your eating or exercise habits, ask your doctor about seeing a registered dietitian or an exercise specialist. 
It can be a big challenge to lose weight. But you do not have to make huge changes at once. Make small changes, and stick with them. When those changes become habit, add a few more changes. If you do not think you are ready to make changes right now, try to pick a date in the future. Make an appointment to see your doctor to discuss whether the time is right for you to start a plan. Follow-up care is a key part of your treatment and safety. Be sure to make and go to all appointments, and call your doctor if you are having problems. It's also a good idea to know your test results and keep a list of the medicines you take. How can you care for yourself at home? · Set realistic goals. Many people expect to lose much more weight than is likely. A weight loss of 5% to 10% of your body weight may be enough to improve your health. · Get family and friends involved to provide support. Talk to them about why you are trying to lose weight, and ask them to help. They can help by participating in exercise and having meals with you, even if they may be eating something different. · Find what works best for you. If you do not have time or do not like to cook, a program that offers meal replacement bars or shakes may be better for you. Or if you like to prepare meals, finding a plan that includes daily menus and recipes may be best. 
· Ask your doctor about other health professionals who can help you achieve your weight loss goals. ¨ A dietitian can help you make healthy changes in your diet. ¨ An exercise specialist or  can help you develop a safe and effective exercise program. 
¨ A counselor or psychiatrist can help you cope with issues such as depression, anxiety, or family problems that can make it hard to focus on weight loss. · Consider joining a support group for people who are trying to lose weight. Your doctor can suggest groups in your area. Where can you learn more? Go to DealGotGameer.be Enter Y950 in the search box to learn more about \"Starting a Weight Loss Plan: After Your Visit. \"  
© 0868-4043 Healthwise, Incorporated. Care instructions adapted under license by [employer] (which disclaims liability or warranty for this information). This care instruction is for use with your licensed healthcare professional. If you have questions about a medical condition or this instruction, always ask your healthcare professional. [address] any warranty or liability for your use of this information. Content Version: 5.7.25179; Last Revised: September 1, 2009 Introducing [facility] SERVICES! [facility] [employer] introduces Skimbl patient portal. Now you can access parts of your medical record, email your doctor's office, and request medication refills online. 1. In your internet browser, go to [email or web address] 2. Click on the First Time User? Click Here link in the Sign In box. You will see the New Member Sign Up page. 3. Enter your Skimbl Access Code exactly as it appears below. You will not need to use this code after youve completed the sign-up process. If you do not sign up before the expiration date, you must request a new code. · Skimbl Access Code: [email or web address] Expires: 4/17/2017 10:56 AM 
 
4. Enter the last four digits of your Social Security Number (xxxx) and Date of Birth (mm/dd/yyyy) as indicated and click Submit. You will be taken to the next sign-up page. 5. Create a Skimbl ID. This will be your Skimbl login ID and cannot be changed, so think of one that is secure and easy to remember. 6. Create a Enova Systemst password. You can change your password at any time. 7. Enter your Password Reset Question and Answer. This can be used at a later time if you forget your password. 8. Enter your e-mail address. You will receive e-mail notification when new information is available in [address] 9. Click Sign Up. You can now view and download portions of your medical record. 10. Click the Download Summary menu link to download a portable copy of your medical information. If you have questions, please visit the Frequently Asked Questions section of the Exablox website. Remember, Exablox is NOT to be used for urgent needs. For medical emergencies, dial 911. Now available from your iPhone and Android! Please provide this summary of care documentation to your next provider. Your primary care clinician is listed as [facility]. If you have any questions after today's visit, please call [phone].

## 2017-02-17 NOTE — PROGRESS NOTES
[address] [facility] Angelika Molina [address], [address]  Tel.  [phone]  Fax. 100 [facility] 60  [place], [address] [facility]  Tel.  [phone]  Fax. [phone] [address]  Tel.  [phone]  Fax. [phone]       Subjective:      Roosevelt Briseno is a 68 y.o. male who I am asked to see in consultation for evaluation and management of sleep apnea. He was diagnosed with sleep apnea several years ago. He is using his device regularly. He complains of awakening in the middle of the night because of mask air leaking associated with tossing and turning. Symptoms began a few weeks ago, unchanged since that time. He usually can fall asleep in 5 minutes. Family or house members note mask air leaking. He denies snoring, choking, periods of not breathing, completely or partially paralyzed while falling asleep or waking up. There are no  mask comfort problems. The following problems are noted with PAP:     Drowsiness no Problems exhaling no   Snoring no Forget to put on no   Mask Comfortable yes Can't fall asleep no   Dry Mouth no Mask falls off no   Air Leaking no Frequent awakenings no     Chon Liu does not wake up frequently at night. He is not bothered by waking up too early and left unable to get back to sleep. He actually sleeps about 9 hours at night and wakes up about 1 times during the night. He does not work shifts:  . Melba Kaur indicates he does not get too little sleep at night. His bedtime is 2100 (between 9-10pm). He awakens at 0800. He does (only on occasions, not very often) take naps. He takes   naps a week lasting  . He has the following observed behaviors:  ; Other (use comments). Other remarks: snoring(only if mask falls off)    Morton Sleepiness Score: 3   which reflect mild daytime drowsiness.     Allergies   Allergen Reactions    Other Food Nausea and Vomiting and Other (comments)     BELL PEPPERS- NUMBNESS FACE & PUFFINESS;  Prednisone Other (comments)     \"NECROTIC HIP\"    Statins-Hmg-Coa Reductase Inhibitors Swelling and Other (comments)     FLUSHING; DISORIENTATION         Current Outpatient Prescriptions:     diclofenac (VOLTAREN) 1 % gel, Apply 2 g to affected area four (4) times daily. , Disp: 1 Each, Rfl: 2    ketoconazole (NIZORAL) 2 % topical cream, Apply  to affected area daily. , Disp: 15 g, Rfl: 6    HYDROcodone-acetaminophen (NORCO) 5-325 mg per tablet, Take 1-2 Tabs by mouth every four (4) hours as needed for Pain. Max Daily Amount: 12 Tabs., Disp: 55 Tab, Rfl: 0    losartan (COZAAR) 100 mg tablet, Take 100 mg by mouth daily. , Disp: , Rfl:     gabapentin (NEURONTIN) 100 mg capsule, TK 1 PO QHS  Indications: NEUROPATHIC PAIN (Patient taking differently: 200 mg. TK 1 PO QHS  Indications: NEUROPATHIC PAIN), Disp: 90 Cap, Rfl: 3    labetalol (NORMODYNE) 100 mg tablet, TK 2 T PO BID FOR BLOOD PRESSURE GREATER THAN 130, Disp: , Rfl: 5    sevelamer carbonate (RENVELA) 800 mg tab tab, Take  by mouth three (3) times daily. 2-4 TABS WITH MEALS, Disp: , Rfl:     VITAMIN B COMP AND C/FA/ZINC (DIALYVITE 800 WITH ZINC PO), Take  by mouth., Disp: , Rfl:     GLUCOSAMINE HCL/MSM (GLUCOSAMINE MSM PO), Take  by mouth. 2 TABS DAILY, Disp: , Rfl:     acetaminophen (TYLENOL EXTRA STRENGTH) 500 mg tablet, Take  by mouth every six (6) hours as needed for Pain., Disp: , Rfl:     hydrALAZINE (APRESOLINE) 50 mg tablet, Take 25 mg by mouth three (3) times daily as needed. , Disp: , Rfl:     cholecalciferol (VITAMIN D3) 1,000 unit cap, Take  by mouth daily. , Disp: , Rfl:     calcitRIOL (ROCALTROL) 0.5 mcg capsule, Take 0.5 mcg by mouth daily. , Disp: , Rfl:     aspirin delayed-release 81 mg tablet, Take  by mouth daily. , Disp: , Rfl:     furosemide (LASIX) 40 mg tablet, Take 1 Tab by mouth two (2) times a day., Disp: 60 Tab, Rfl: 6     He  has a past medical history of Adverse effect of anesthesia; CAD (coronary artery disease); Chronic kidney disease; GERD (gastroesophageal reflux disease); Hypertension; Neuropathy; Sleep apnea; and Thromboembolus ([address] He  has a past surgical history that includes cardiac surg procedure unlist (2011); rotator cuff repair; appendectomy; urological; urological; tonsillectomy; adenoidectomy; other surgical (2013); other surgical (2012); and hernia repair (2013). He family history includes Cancer in his brother; Heart Disease in his father; Hypertension in his father and mother. There is no history of Anesth Problems. He  reports that he has quit smoking. He has never used smokeless tobacco. He reports that he drinks alcohol. He reports that he does not use illicit drugs. Review of Systems:  Constitutional:  No significant weight loss or weight gain. Eyes:  No blurred vision. CVS:  No significant chest pain  Pulm:  No significant shortness of breath  GI:  No significant nausea or vomiting  :  No significant nocturia  Musculoskeletal:  No significant joint pain at night  Skin:  No significant rashes  Neuro:  No significant dizziness   Psych:  No active mood issues    Sleep Review of Systems: notable for no difficulty falling asleep; infrequent awakenings at night;  regular dreaming noted; no nightmares ; no early morning headaches ; no memory problems; no concentration issues; no history of any automobile or occupational accidents due to daytime drowsiness. Objective:     Visit Vitals    /75    Pulse (!) 58    Temp 98.4 °F (36.9 °C)    Ht 5' 10\" (1.778 m)    Wt 254 lb (115.2 kg)    SpO2 96%    BMI 36.45 kg/m2         General:   Not in acute distress   Eyes:  Anicteric sclerae, no obvious strabismus   Nose:  No obvious nasal septum deviation    Oropharynx:   Class 3 oropharyngeal outlet, thick tongue base, , low-lying soft palate, narrow tonsilo-pharyngeal pilars   Tonsils:   tonsils are unappreciated   Neck:   Neck circ.  in \"inches\": 19; midline trachea   Chest/Lungs: Equal lung expansion, clear on auscultation    CVS:  Normal rate, regular rhythm; no JVD   Skin:  Warm to touch; no obvious rashes   Neuro:  No focal deficits ; no obvious tremor    Psych:  Normal affect,  normal countenance;          Assessment:       ICD-10-CM ICD-9-CM    1. KAYA (obstructive sleep apnea) G47.33 327.23 AMB SUPPLY ORDER   2. Obesity (BMI 30-39. 9) E66.9 278.00      AHI = ?. On CPAP :      Compliant:      yes    Therapeutic Response:  Positive  Plan:     * He was provided information on sleep apnea including coresponding risk factors and the importance of proper treatment. * He was likewise counseled on weight management and lateral sleeping posture (with the use of bed pillows or wedge) which may reduce sleep apnea events. Caution with hypnotics, alcohol, and sedating pain medications as these can worsen sleep apnea. * We've requested previous sleep records and studies. Follow-up Disposition:  Return in about 1 year (around 2/17/2018), or if symptoms worsen or fail to improve. Counseling was provided regarding the importance of regular PAP use and on proper sleep hygiene and safe driving. I have reviewed/discussed the above normal BMI with the patient. I have recommended the following interventions: dietary management education, guidance, and counseling . The plan is as follows: I have counseled this patient on diet and exercise regimens. .    Thank you for allowing us to participate in your patient's medical care.     Martin Canela M.D.  (electronically signed)  Diplomate in Sleep Medicine, [facility]

## 2017-02-22 NOTE — TELEPHONE ENCOUNTER
Pt called at the number on file. Nurse left a voicemail to informed the pt, the medication that was requested, was approved and sent to the pharmacy, pt verbalize understanding.

## 2017-03-02 NOTE — PROGRESS NOTES
New patient presenting with numbness and tingling in feet for past couple years. Has had some falls and stumbles due to numbness in feet. Reports fatigue in feet and numbness on balls of feet. No testing done, Peritoneal dialysis.

## 2017-03-02 NOTE — PATIENT INSTRUCTIONS
Information Regarding Testing     If you have physican order for a test or a medication denied by your insurance company, this does not mean the test or medication is not appropriate for you as that is a medical decision, not a decision to be made by an insurance company representative or by an [facility] physician who has not interviewed and examined you. This is a decision to be made between you and your physician. The denial of services is a contractual matter between you and your insurance company, not an issue between your physician and the insurance company. If your test or medication is denied, you can take the following steps to help resolve the issue:    1. File a complaint with the [facility] regarding your insurance company's denial of services ordered for you. You can do this either by calling them directly or by completing an on-line complaint form on the Reflex. This can be found at [email or web address] [email or web address] Click    2. Also file a formal complaint with your insurance company and ask to have the name of the person denying the service so that you may explore a legal option should you be harmed by this denial of service. Again, the fact the insurance company will not pay for the service does not mean it is not medically necessary and I would encourage you to follow through with the plan that was made with your physician    3. File a written complaint with your employer so your employer and benefit manager is aware of the poor coverage they are providing their employees. If you have medicare/medicaid, complain to your representative in the House and to your Michelle Voss.         [facility]   Statement to Patients  April 1, 2014      In an effort to ensure the large volume of patient prescription refills is processed in the most efficient and expeditious manner, we are asking our patients to assist us by calling your Pharmacy for all prescription refills, this will include also your  Mail Order Pharmacy. The pharmacy will contact our office electronically to continue the refill process. Please do not wait until the last minute to call your pharmacy. We need at least 48 hours (2days) to fill prescriptions. We also encourage you to call your pharmacy before going to  your prescription to make sure it is ready. With regard to controlled substance prescription refill requests (narcotic refills) that need to be picked up at our office, we ask your cooperation by providing us with at least 72 hours (3days) notice that you will need a refill. We will not refill narcotic prescription refill requests after 4:00pm on any weekday, Monday through Thursday, or after 2:00pm on Fridays, or on the weekends. We encourage everyone to explore another way of getting your prescription refill request processed using [employer], our patient web portal through our electronic medical record system. [employer] is an efficient and effective way to communicate your medication request directly to the office and  downloadable as an orestes on your smart phone . [employer] also features a review functionality that allows you to view your medication list as well as leave messages for your physician. Are you ready to get connected? If so please review the attatched instructions or speak to any of our staff to get you set up right away! Thank you so much for your cooperation. Should you have any questions please contact our Practice Administrator. The Physicians and Staff,  [facility]       If we have ordered testing for you, we do not call patients with results and we do not give test results over the phone. We schedule follow up appointments so that your results can be discussed in person and any questions you have regarding them may be addressed.   If something of concern is revealed on your test, we will call you for a sooner follow up appointment. Additionally, results may be found by using the My Chart feature and one of our patient service representatives at the  can give you instructions on how to access this feature of our electronic medical record system. Learning About Living [place]  What is a living will? A living will is a legal form you use to write down the kind of care you want at the end of your life. It is used by the health professionals who will treat you if you aren't able to decide for yourself. If you put your wishes in writing, your loved ones and others will know what kind of care you want. They won't need to guess. This can ease your mind and be helpful to others. A living will is not the same as an estate or property will. An estate will explains what you want to happen with your money and property after you die. Is a living will a legal document? A living will is a legal document. Each state has its own laws about living [place]. If you move to another state, make sure that your living will is legal in the state where you now live. Or you might use a universal form that has been approved by many states. This kind of form can sometimes be completed and stored online. Your electronic copy will then be available wherever you have a connection to the Internet. In most cases, doctors will respect your wishes even if you have a form from a different state. · You don't need an  to complete a living will. But legal advice can be helpful if your state's laws are unclear, your health history is complicated, or your family can't agree on what should be in your living will. · You can change your living will at any time. Some people find that their wishes about end-of-life care change as their health changes. · In addition to making a living will, think about completing a medical power of  form.  This form lets you name the person you want to make end-of-life treatment decisions for you (your \"health care agent\") if you're not able to. Many hospitals and nursing homes will give you the forms you need to complete a living will and a medical power of . · Your living will is used only if you can't make or communicate decisions for yourself anymore. If you become able to make decisions again, you can accept or refuse any treatment, no matter what you wrote in your living will. · Your state may offer an online registry. This is a place where you can store your living will online so the doctors and nurses who need to treat you can find it right away. What should you think about when creating a living will? Talk about your end-of-life wishes with your family members and your doctor. Let them know what you want. That way the people making decisions for you won't be surprised by your choices. Think about these questions as you make your living will:  · Do you know enough about life support methods that might be used? If not, talk to your doctor so you know what might be done if you can't breathe on your own, your heart stops, or you're unable to swallow. · What things would you still want to be able to do after you receive life-support methods? Would you want to be able to walk? To speak? To eat on your own? To live without the help of machines? · If you have a choice, where do you want to be cared for? In your home? At a hospital or nursing home? · Do you want certain Gnosticism practices performed if you become very ill? · If you have a choice at the end of your life, where would you prefer to die? At home? In a hospital or nursing home? Somewhere else? · Would you prefer to be buried or cremated? · Do you want your organs to be donated after you die? What should you do with your living will? · Make sure that your family members and your health care agent have copies of your living will. · Give your doctor a copy of your living will to keep in your medical record.  If you have more than one doctor, make sure that each one has a copy. · You may want to put a copy of your living will where it can be easily found. Where can you learn more? Go to http://kd-sagar.info/. Enter Z136 in the search box to learn more about \"Learning About Living [place]. \"  Current as of: February 24, 2016  Content Version: 11.1  © 4673-0803 [employer]. Care instructions adapted under license by [employer] (which disclaims liability or warranty for this information). If you have questions about a medical condition or this instruction, always ask your healthcare professional. [address] any warranty or liability for your use of this information.

## 2017-03-02 NOTE — PROGRESS NOTES
[address] [facility] [address]   [place] [address] Min[address] [place], [address]    [place]   [phone] Fax             Referring: Connie Choudhary MD      Chief Complaint   Patient presents with    Numbness     new patient       71-year-old right-handed gentleman who presents today with his wife for evaluation of numbness and tingling in his feet. He notes his symptoms began he believes after his coronary artery bypass grafting. He notes that the soles of his feet are numb. It will go up to the front of the foot as well. He was tried on Neurontin and he has been taking 1-2 per day. Is not made much difference. He says that he has been on the Neurontin for about 3 years now. Discomfort is described as burning and painful. In retrospect he does think the Neurontin has made it tolerable she has not made it go away. He believes the abnormal sensations have progressed up the dorsum of the foot. He has had some decreased balance. He says that he is not had any recent falls. He just does not feel as steady on his feet. He does better on level ground versus uneven ground.     Review of laboratory analysis in the electronic medical record finds he had a normal A1c of 5.1   Past Medical History:   Diagnosis Date    Adverse effect of anesthesia     WAKING DURING SURGERY; COMBATIVE    CAD (coronary artery disease)     Chronic kidney disease     GERD (gastroesophageal reflux disease)     HX-NOT CURRENTLY    Hypertension     Neuropathy     Sleep apnea     CPAP    Thromboembolus (HCC)     L LEG- PT STATED HE DID NOT SEE AN M.YUAN Torres 2010       Past Surgical History:   Procedure Laterality Date    CARDIAC SURG PROCEDURE UNLIST  2011    CABG; AORTIC VALVE    HX ADENOIDECTOMY      HX APPENDECTOMY      HX HERNIA REPAIR  2013    [facility]    HX OTHER SURGICAL  2013    PERITONEAL CATHETER     HX OTHER SURGICAL  2012    R CHEST CATH FOR DIALYSIS / REMOVAL    HX ROTATOR CUFF REPAIR      RIGHT    HX TONSILLECTOMY      HX UROLOGICAL      KIDNEY STONE REMOVED    HX UROLOGICAL      LITHOTRIPSY       Current Outpatient Prescriptions   Medication Sig Dispense Refill    gabapentin (NEURONTIN) 100 mg capsule Take 2 Caps by mouth nightly. TK 1 PO QHS  Indications: NEUROPATHIC PAIN 180 Cap 3    diclofenac (VOLTAREN) 1 % gel Apply 2 g to affected area four (4) times daily. 1 Each 2    ketoconazole (NIZORAL) 2 % topical cream Apply  to affected area daily. 15 g 6    HYDROcodone-acetaminophen (NORCO) 5-325 mg per tablet Take 1-2 Tabs by mouth every four (4) hours as needed for Pain. Max Daily Amount: 12 Tabs. 55 Tab 0    losartan (COZAAR) 100 mg tablet Take 100 mg by mouth daily.  labetalol (NORMODYNE) 100 mg tablet TK 2 T PO BID FOR BLOOD PRESSURE GREATER THAN 130  5    sevelamer carbonate (RENVELA) 800 mg tab tab Take  by mouth three (3) times daily. 2-4 TABS WITH MEALS      VITAMIN B COMP AND C/FA/ZINC (DIALYVITE 800 WITH ZINC PO) Take  by mouth.  GLUCOSAMINE HCL/MSM (GLUCOSAMINE MSM PO) Take  by mouth. 2 TABS DAILY      acetaminophen (TYLENOL EXTRA STRENGTH) 500 mg tablet Take  by mouth every six (6) hours as needed for Pain.  hydrALAZINE (APRESOLINE) 50 mg tablet Take 25 mg by mouth three (3) times daily as needed.  cholecalciferol (VITAMIN D3) 1,000 unit cap Take  by mouth daily.  calcitRIOL (ROCALTROL) 0.5 mcg capsule Take 0.5 mcg by mouth daily.  aspirin delayed-release 81 mg tablet Take  by mouth daily.  furosemide (LASIX) 40 mg tablet Take 1 Tab by mouth two (2) times a day.  60 Tab 6        Allergies   Allergen Reactions    Other Food Nausea and Vomiting and Other (comments)     BELL PEPPERS- NUMBNESS FACE & PUFFINESS;      Prednisone Other (comments)     \"NECROTIC HIP\"    Statins-Hmg-Coa Reductase Inhibitors Swelling and Other (comments)     FLUSHING; DISORIENTATION       Social History   Substance Use Topics  Smoking status: Former Smoker    Smokeless tobacco: Never Used    Alcohol use Yes      Comment: OCCASIONAL       Family History   Problem Relation Age of Onset    Hypertension Mother     Hypertension Father     Heart Disease Father     Cancer Brother      PROSTATE    Anesth Problems Neg Hx        Review of Systems  Documented above. Remainder of comprehensive review is negative. Examination  Visit Vitals    BP (!) 144/92    Pulse 69    Temp 97.9 °F (36.6 °C)    Resp 16    Ht 5' 10\" (1.778 m)    Wt 117 kg (258 lb)    SpO2 97%    BMI 37.02 kg/m2     Pleasant, well appearing. No icterus. Oropharynx clear. Supple neck without bruit. Heart regular. No murmur. No edema. Neurologically, he is awake, alert, oriented with normal speech, language, and cognition. Visual fields are full to direct confrontational testing. He has sharp disk margins bilaterally. Pupils react bilaterally. He has full versions without nystagmus. Face is symmetrical with symmetrical facial sensation. Tongue and palate are midline. Shoulder shrug is symmetrical. Hearing is intact to conversation. He has normal bulk and tone. There is no abnormal movement. There is no pronation or drift. He is able to generate full strength in the upper and lower extremities and all muscle groups to direct confrontational testing. Reflexes are symmetrical in the upper and lower extremities bilaterally. His toes are down going. There is no Gipson. There is no finger flexor reflex bilaterally. He has no ataxia or past pointing. Sensory examination is intact to primary modalities and there is no lateralization of sensation. No Romberg is present. There is no extinction. He is able to ambulate without difficulty. Impression/Plan  68-year-old gentleman with abnormal sensations in the lower extremities question peripheral neuropathy question other. We will get an EMG to see if we see evidence of large fiber neuropathy.   If this is negative we may wish to proceed with skin biopsy looking for small fiber neuropathy. Follow-up after EMG. This note was created using voice recognition software. Despite editing, there may be syntax errors. This note will not be viewable in [address]

## 2017-03-02 NOTE — MR AVS SNAPSHOT
Visit Information Date & Time Provider Department Dept. Phone Encounter #  
 3/2/2017  1:00 PM Odette Favre, MD [facility] [phone] [id number] Follow-up Instructions Return for After EMG. Your Appointments 3/24/2017 10:00 AM  
PROCEDURE with EMG SCHEDULE Neurology [address] [facility]) Appt Note: em[place] per dr Jefferson Callow [facility] [place] [address] [address] [phone]  
  
   
 Yoseph Vazide  
  
    
 3/24/2017 11:00 AM  
Follow Up with Krista Zamudio NP Neurology [facility] [address] [place] [address]) Appt Note: [place] [facility] [address] [address] [phone]  
  
   
 [place] [address] [id number] [address] Upcoming Health Maintenance Date Due DTaP/Tdap/Td series (1 - Tdap) 6/28/1961 ZOSTER VACCINE AGE 60> 6/28/2000 GLAUCOMA SCREENING Q2Y 6/28/2005 Pneumococcal 65+ High/Highest Risk (1 of 2 - PCV13) 6/28/2005 INFLUENZA AGE 9 TO ADULT 8/1/2016 MEDICARE YEARLY EXAM 1/18/2018 Allergies as of 3/2/2017  Review Complete On: 3/2/2017 By: Odette Favre, MD  
  
 Severity Noted Reaction Type Reactions Other Food  11/30/2016    Nausea and Vomiting, Other (comments) BELL PEPPERS- NUMBNESS FACE & PUFFINESS;    
 Prednisone  11/30/2016    Other (comments) \"NECROTIC HIP\" Statins-hmg-coa Reductase Inhibitors  11/30/2016    Swelling, Other (comments) FLUSHING; DISORIENTATION Current Immunizations  Never Reviewed No immunizations on file. Not reviewed this visit You Were Diagnosed With   
  
 Codes Comments Numbness in feet    -  Primary ICD-10-CM: R20.0 ICD-9-CM: 231. 0 Vitals BP  
  
  
  
  
  
 (!) 144/92 BMI and BSA Data Body Mass Index Body Surface Area 37.02 kg/m 2 2.4 m 2 Preferred Pharmacy Pharmacy Name Phone [facility] [address] [facility],  [place] Frye [address] [place] [address] [phone] Your Updated Medication List  
  
   
This list is accurate as of: 3/2/17  1:57 PM.  Always use your most recent med list.  
  
  
  
  
 aspirin delayed-release 81 mg tablet Take  by mouth daily. calcitRIOL 0.5 mcg capsule Commonly known as:  ROCALTROL Take 0.5 mcg by mouth daily. DIALYVITE 800 WITH ZINC PO Take  by mouth. diclofenac 1 % Gel Commonly known as:  VOLTAREN Apply 2 g to affected area four (4) times daily. furosemide 40 mg tablet Commonly known as:  LASIX Take 1 Tab by mouth two (2) times a day.  
  
 gabapentin 100 mg capsule Commonly known as:  NEURONTIN Take 2 Caps by mouth nightly. TK 1 PO QHS  Indications: NEUROPATHIC PAIN  
  
 GLUCOSAMINE MSM PO Take  by mouth. 2 TABS DAILY  
  
 hydrALAZINE 50 mg tablet Commonly known as:  APRESOLINE Take 25 mg by mouth three (3) times daily as needed. HYDROcodone-acetaminophen 5-325 mg per tablet Commonly known as:  Michelle Arts Take 1-2 Tabs by mouth every four (4) hours as needed for Pain. Max Daily Amount: 12 Tabs.  
  
 ketoconazole 2 % topical cream  
Commonly known as:  NIZORAL Apply  to affected area daily. labetalol 100 mg tablet Commonly known as:  NORMODYNE  
TK 2 T PO BID FOR BLOOD PRESSURE GREATER THAN 130  
  
 losartan 100 mg tablet Commonly known as:  COZAAR Take 100 mg by mouth daily. RENVELA 800 mg Tab tab Generic drug:  sevelamer carbonate Take  by mouth three (3) times daily. 2-4 TABS WITH MEALS  
  
 TYLENOL EXTRA STRENGTH 500 mg tablet Generic drug:  acetaminophen Take  by mouth every six (6) hours as needed for Pain. VITAMIN D3 1,000 unit Cap Generic drug:  cholecalciferol Take  by mouth daily. Follow-up Instructions Return for After EMG. To-Do List   
 03/02/2017 Neurology:  EMG LIMITED Patient Instructions Information Regarding Testing If you have physican order for a test or a medication denied by your insurance company, this does not mean the test or medication is not appropriate for you as that is a medical decision, not a decision to be made by an insurance company representative or by an [facility] physician who has not interviewed and examined you. This is a decision to be made between you and your physician. The denial of services is a contractual matter between you and your insurance company, not an issue between your physician and the insurance company. If your test or medication is denied, you can take the following steps to help resolve the issue: 1. File a complaint with the [facility] regarding your insurance company's denial of services ordered for you. You can do this either by calling them directly or by completing an on-line complaint form on the [place]. This can be found at [email or web address].3KeyIt 2. Also file a formal complaint with your insurance company and ask to have the name of the person denying the service so that you may explore a legal option should you be harmed by this denial of service. Again, the fact the insurance company will not pay for the service does not mean it is not medically necessary and I would encourage you to follow through with the plan that was made with your physician 3. File a written complaint with your employer so your employer and benefit manager is aware of the poor coverage they are providing their employees. If you have medicare/medicaid, complain to your representative in the House and to your Michelle Voss. PRESCRIPTION REFILL POLICY [facility] Statement to Patients April 1, 2014 In an effort to ensure the large volume of patient prescription refills is processed in the most efficient and expeditious manner, we are asking our patients to assist us by calling your Pharmacy for all prescription refills, this will include also your  Mail Order Pharmacy. The pharmacy will contact our office electronically to continue the refill process. Please do not wait until the last minute to call your pharmacy. We need at least 48 hours (2days) to fill prescriptions. We also encourage you to call your pharmacy before going to  your prescription to make sure it is ready. With regard to controlled substance prescription refill requests (narcotic refills) that need to be picked up at our office, we ask your cooperation by providing us with at least 72 hours (3days) notice that you will need a refill. We will not refill narcotic prescription refill requests after 4:00pm on any weekday, Monday through Thursday, or after 2:00pm on Fridays, or on the weekends. We encourage everyone to explore another way of getting your prescription refill request processed using ParaEngine, our patient web portal through our electronic medical record system. ParaEngine is an efficient and effective way to communicate your medication request directly to the office and  downloadable as an orestes on your smart phone . Para[employer] also features a review functionality that allows you to view your medication list as well as leave messages for your physician. Are you ready to get connected? If so please review the attatched instructions or speak to any of our staff to get you set up right away! Thank you so much for your cooperation. Should you have any questions please contact our Practice Administrator. The Physicians and Staff,  [facility] Neurology [facility] If we have ordered testing for you, we do not call patients with results and we do not give test results over the phone.   We schedule follow up appointments so that your results can be discussed in person and any questions you have regarding them may be addressed. If something of concern is revealed on your test, we will call you for a sooner follow up appointment. Additionally, results may be found by using the My Chart feature and one of our patient service representatives at the  can give you instructions on how to access this feature of our electronic medical record system. Brittni Mcbride 1721 What is a living will? A living will is a legal form you use to write down the kind of care you want at the end of your life. It is used by the health professionals who will treat you if you aren't able to decide for yourself. If you put your wishes in writing, your loved ones and others will know what kind of care you want. They won't need to guess. This can ease your mind and be helpful to others. A living will is not the same as an estate or property will. An estate will explains what you want to happen with your money and property after you die. Is a living will a legal document? A living will is a legal document. Each state has its own laws about living monzon. If you move to another state, make sure that your living will is legal in the state where you now live. Or you might use a universal form that has been approved by many states. This kind of form can sometimes be completed and stored online. Your electronic copy will then be available wherever you have a connection to the Internet. In most cases, doctors will respect your wishes even if you have a form from a different state. · You don't need an  to complete a living will. But legal advice can be helpful if your state's laws are unclear, your health history is complicated, or your family can't agree on what should be in your living will. · You can change your living will at any time. Some people find that their wishes about end-of-life care change as their health changes. · In addition to making a living will, think about completing a medical power of  form. This form lets you name the person you want to make end-of-life treatment decisions for you (your \"health care agent\") if you're not able to. Many hospitals and nursing homes will give you the forms you need to complete a living will and a medical power of . · Your living will is used only if you can't make or communicate decisions for yourself anymore. If you become able to make decisions again, you can accept or refuse any treatment, no matter what you wrote in your living will. · Your state may offer an online registry. This is a place where you can store your living will online so the doctors and nurses who need to treat you can find it right away. What should you think about when creating a living will? Talk about your end-of-life wishes with your family members and your doctor. Let them know what you want. That way the people making decisions for you won't be surprised by your choices. Think about these questions as you make your living will: · Do you know enough about life support methods that might be used? If not, talk to your doctor so you know what might be done if you can't breathe on your own, your heart stops, or you're unable to swallow. · What things would you still want to be able to do after you receive life-support methods? Would you want to be able to walk? To speak? To eat on your own? To live without the help of machines? · If you have a choice, where do you want to be cared for? In your home? At a hospital or nursing home? · Do you want certain Mormonism practices performed if you become very ill? · If you have a choice at the end of your life, where would you prefer to die? At home? In a hospital or nursing home? Somewhere else? · Would you prefer to be buried or cremated? · Do you want your organs to be donated after you die? What should you do with your living will? · Make sure that your family members and your health care agent have copies of your living will. · Give your doctor a copy of your living will to keep in your medical record. If you have more than one doctor, make sure that each one has a copy. · You may want to put a copy of your living will where it can be easily found. Where can you learn more? Go to http://kd-sagar.info/. Enter T323 in the search box to learn more about \"Learning About Living Pavan Addison. \" Current as of: February 24, 2016 Content Version: 11.1 © 0749-4151 [employer]. Care instructions adapted under license by [employer] (which disclaims liability or warranty for this information). If you have questions about a medical condition or this instruction, always ask your healthcare professional. [address] any warranty or liability for your use of this information. Introducing [facility] & HEALTH SERVICES! Kenisha Govea introduces [employer] patient portal. Now you can access parts of your medical record, email your doctor's office, and request medication refills online. 1. In your internet browser, go to [email or web address] 2. Click on the First Time User? Click Here link in the Sign In box. You will see the New Member Sign Up page. 3. Enter your [employer] Access Code exactly as it appears below. You will not need to use this code after youve completed the sign-up process. If you do not sign up before the expiration date, you must request a new code. · [employer] Access Code: [email or web address] Expires: 4/17/2017 10:56 AM 
 
4. Enter the last four digits of your Social Security Number (xxxx) and Date of Birth (mm/dd/yyyy) as indicated and click Submit. You will be taken to the next sign-up page. 5. Create a [employer] ID. This will be your [employer] login ID and cannot be changed, so think of one that is secure and easy to remember. 6. Create a [employer] password. You can change your password at any time. 7. Enter your Password Reset Question and Answer. This can be used at a later time if you forget your password. 8. Enter your e-mail address. You will receive e-mail notification when new information is available in [address] 9. Click Sign Up. You can now view and download portions of your medical record. 10. Click the Download Summary menu link to download a portable copy of your medical information. If you have questions, please visit the Frequently Asked Questions section of the [employer] website. Remember, [employer] is NOT to be used for urgent needs. For medical emergencies, dial 911. Now available from your iPhone and Android! Please provide this summary of care documentation to your next provider. Your primary care clinician is listed as Katheryn Madsen. If you have any questions after today's visit, please call [phone].

## 2017-03-03 NOTE — PROGRESS NOTES
Patient submitted supplies order to [address] [facility] said they needed sleep study result. I called patient, patient said, he had already submitted and will follow up with them again.

## 2017-05-31 NOTE — PROGRESS NOTES
Chief Complaint   Patient presents with   [facility] Follow Up     he is a 68y.o. year old male who presents for evalution. Recently discharged from hospital for peritonitis associated with the peritoneal dialysis  He is getting hemolodialized now. He was discharged on may 19th  He had been in 10 days, so he was admitted about the 9th. He was at [facility]. He had all liquids while there. He had a prodcedure to lavage the abdomen na d had a central line placed for hemoialysis which he still has. Eventually the plan is to go back to [place]     He checks BP 3 times a day and takes meds prn. Reviewed PmHx, RxHx, FmHx, SocHx, AllgHx and updated and dated in the chart. Patient Active Problem List    Diagnosis    Murmur, cardiac    Neuropathic pain of both feet ([address]    ESRD on [facility] ([facility]    Essential hypertension with goal blood pressure less than 140/90    Edema    Hearing loss       Nurse notes were reviewed and copied and are correct  Review of Systems - negative except as listed above in the HPI    Objective:     Vitals:    05/31/17 1038   BP: (!) 164/92   Pulse: 69   Resp: 16   Temp: 98.3 °F (36.8 °C)   SpO2: 97%   Weight: 232 lb (105.2 kg)   Height: 5' 10\" (1.778 m)     Physical Examination: General appearance - alert, well appearing, and in no distress  Mental status - alert, oriented to person, place, and time  Chest - clear to auscultation, no wheezes, rales or rhonchi, symmetric air entry  Heart - systolic murmur 2/6 at 2nd left intercostal space  Abdomen - soft, nontender, nondistended, no masses or organomegaly  Healed surgical scars  Extremities - peripheral pulses normal, no pedal edema, no clubbing or cyanosis  Skin - normal coloration and turgor, no rashes, no suspicious skin lesions noted         Assessment/ Plan:   Aruna Crum was seen today for hospital follow up. Diagnoses and all orders for this visit:    Hospital discharge follow-up  Doing well. No fever.  Getting hemodialysed  Murmur, cardiac  stable  Essential hypertension with goal blood pressure less than 140/90  The BP is up and down-   Edema, unspecified type    ESRD on peritoneal dialysis [facility])    Neuropathic pain of both feet ([facility]       Follow-up Disposition:  Return in about 3 months (around 8/31/2017). ICD-10-CM ICD-9-CM    1. Hospital discharge follow-up Z09 V67.59    2. Murmur, cardiac R01.1 785.2    3. Essential hypertension with goal blood pressure less than 140/90 I10 401.9    4. Edema, unspecified type R60.9 782.3    5. ESRD on peritoneal dialysis ([facility] N18.6 585.6     Z99.2 V45.11    6. Neuropathic pain of both feet (HCC) G62.9 356.9        I have discussed the diagnosis with the patient and the intended plan as seen in the above orders. The patient has received an after-visit summary and questions were answered concerning future plans. Medication Side Effects and Warnings were discussed with patient: yes  Patient Labs were reviewed and or requested: yes  Patient Past Records were reviewed and or requested: yes        There are no Patient Instructions on file for this visit.     The patient verbalizes understanding and agrees with the plan of care        Patient has the advanced directives booklet to review

## 2017-05-31 NOTE — MR AVS SNAPSHOT
Visit Information Date & Time Provider Department Dept. Phone Encounter #  
 5/31/2017 10:15 AM Martin Hartman MD [address] [place] [address] Primary Care [phone] [id number] Follow-up Instructions Return in about 3 months (around 8/31/2017). Upcoming Health Maintenance Date Due DTaP/Tdap/Td series (1 - Tdap) 6/28/1961 ZOSTER VACCINE AGE 60> 6/28/2000 GLAUCOMA SCREENING Q2Y 6/28/2005 Pneumococcal 65+ High/Highest Risk (1 of 2 - PCV13) 6/28/2005 INFLUENZA AGE 9 TO ADULT 8/1/2017 MEDICARE YEARLY EXAM 1/18/2018 Allergies as of 5/31/2017  Review Complete On: 5/31/2017 By: Martin Hartman MD  
  
 Severity Noted Reaction Type Reactions Other Food  11/30/2016    Nausea and Vomiting, Other (comments) BELL PEPPERS- NUMBNESS FACE & PUFFINESS;    
 Prednisone  11/30/2016    Other (comments) \"NECROTIC HIP\" Statins-hmg-coa Reductase Inhibitors  11/30/2016    Swelling, Other (comments) FLUSHING; DISORIENTATION Current Immunizations  Never Reviewed No immunizations on file. Not reviewed this visit You Were Diagnosed With   
  
 Codes Comments Murmur, cardiac    -  Primary ICD-10-CM: R01.1 ICD-9-CM: 785.2 Essential hypertension with goal blood pressure less than 140/90     ICD-10-CM: I10 
ICD-9-CM: 401.9 Edema, unspecified type     ICD-10-CM: R60.9 ICD-9-CM: 782.3 ESRD on peritoneal dialysis ([facility]     ICD-10-CM: N18.6, Z99.2 ICD-9-CM: 585.6, V45.11 Neuropathic pain of both feet (HCC)     ICD-10-CM: G62.9 ICD-9-CM: 356.9 Vitals BP Pulse Temp Resp Height(growth percentile) Weight(growth percentile) (!) 164/92 69 98.3 °F (36.8 °C) 16 5' 10\" (1.778 m) 232 lb (105.2 kg) SpO2 BMI Smoking Status 97% 33.29 kg/m2 Former Smoker Vitals History BMI and BSA Data Body Mass Index Body Surface Area  
 33.29 kg/m 2 2.28 m 2 Preferred Pharmacy Pharmacy Name Phone [facility] [address] [facility],  [place] Frye [address] [place] [address] [phone] Your Updated Medication List  
  
   
This list is accurate as of: 5/31/17 11:07 AM.  Always use your most recent med list.  
  
  
  
  
 aspirin delayed-release 81 mg tablet Take  by mouth daily. calcitRIOL 0.5 mcg capsule Commonly known as:  ROCALTROL Take 0.5 mcg by mouth daily. DIALYVITE 800 WITH ZINC PO Take  by mouth. diclofenac 1 % Gel Commonly known as:  VOLTAREN Apply 2 g to affected area four (4) times daily. furosemide 40 mg tablet Commonly known as:  LASIX Take 1 Tab by mouth two (2) times a day.  
  
 gabapentin 100 mg capsule Commonly known as:  NEURONTIN Take 2 Caps by mouth nightly. TK 1 PO QHS  Indications: NEUROPATHIC PAIN  
  
 GLUCOSAMINE MSM PO Take  by mouth. 2 TABS DAILY  
  
 hydrALAZINE 50 mg tablet Commonly known as:  APRESOLINE Take 25 mg by mouth three (3) times daily as needed. HYDROcodone-acetaminophen 5-325 mg per tablet Commonly known as:  Kris Curtis Take 1-2 Tabs by mouth every four (4) hours as needed for Pain. Max Daily Amount: 12 Tabs.  
  
 ketoconazole 2 % topical cream  
Commonly known as:  NIZORAL Apply  to affected area daily. labetalol 100 mg tablet Commonly known as:  NORMODYNE  
TK 2 T PO BID FOR BLOOD PRESSURE GREATER THAN 130  
  
 losartan 100 mg tablet Commonly known as:  COZAAR Take 100 mg by mouth daily. RENVELA 800 mg Tab tab Generic drug:  sevelamer carbonate Take  by mouth three (3) times daily. 2-4 TABS WITH MEALS  
  
 TYLENOL EXTRA STRENGTH 500 mg tablet Generic drug:  acetaminophen Take  by mouth every six (6) hours as needed for Pain. VITAMIN D3 1,000 unit Cap Generic drug:  cholecalciferol Take  by mouth daily. Follow-up Instructions Return in about 3 months (around 8/31/2017). Introducing [facility] & HEALTH SERVICES! [facility] introduces [employer] patient portal. Now you can access parts of your medical record, email your doctor's office, and request medication refills online. 1. In your internet browser, go to [email or web address] 2. Click on the First Time User? Click Here link in the Sign In box. You will see the New Member Sign Up page. 3. Enter your [employer] Access Code exactly as it appears below. You will not need to use this code after youve completed the sign-up process. If you do not sign up before the expiration date, you must request a new code. · [employer] Access Code: [email or web address] Expires: 8/29/2017 11:06 AM 
 
4. Enter the last four digits of your Social Security Number (xxxx) and Date of Birth (mm/dd/yyyy) as indicated and click Submit. You will be taken to the next sign-up page. 5. Create a [employer] ID. This will be your [employer] login ID and cannot be changed, so think of one that is secure and easy to remember. 6. Create a [employer] password. You can change your password at any time. 7. Enter your Password Reset Question and Answer. This can be used at a later time if you forget your password. 8. Enter your e-mail address. You will receive e-mail notification when new information is available in [address] 9. Click Sign Up. You can now view and download portions of your medical record. 10. Click the Download Summary menu link to download a portable copy of your medical information. If you have questions, please visit the Frequently Asked Questions section of the [employer] website. Remember, [employer] is NOT to be used for urgent needs. For medical emergencies, dial 911. Now available from your iPhone and Android! Please provide this summary of care documentation to your next provider. Your primary care clinician is listed as Emilie Gaitan. If you have any questions after today's visit, please call [phone].

## 2017-08-28 NOTE — TELEPHONE ENCOUNTER
Pt has an appt. Scheduled for Dr. Alexandro Murray on Aug. 31st, but she will be out of the office. Pt contacted to reschedule appt, but no answer. Left VM advising pt that there was an available opening for Aug. 29th at 1:30 p.m. Scheduled pt for that available appt until pt returns call.